# Patient Record
Sex: FEMALE | Race: WHITE
[De-identification: names, ages, dates, MRNs, and addresses within clinical notes are randomized per-mention and may not be internally consistent; named-entity substitution may affect disease eponyms.]

---

## 2017-02-05 ENCOUNTER — HOSPITAL ENCOUNTER (EMERGENCY)
Dept: HOSPITAL 62 - ER | Age: 22
Discharge: HOME | End: 2017-02-05
Payer: COMMERCIAL

## 2017-02-05 VITALS — SYSTOLIC BLOOD PRESSURE: 107 MMHG | DIASTOLIC BLOOD PRESSURE: 50 MMHG

## 2017-02-05 DIAGNOSIS — S00.03XA: Primary | ICD-10-CM

## 2017-02-05 DIAGNOSIS — Z88.6: ICD-10-CM

## 2017-02-05 DIAGNOSIS — R42: ICD-10-CM
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
## 2017-02-05 DIAGNOSIS — R11.0: ICD-10-CM

## 2017-02-05 PROCEDURE — S0119 ONDANSETRON 4 MG: HCPCS

## 2017-02-05 PROCEDURE — 70450 CT HEAD/BRAIN W/O DYE: CPT

## 2017-02-05 PROCEDURE — 99284 EMERGENCY DEPT VISIT MOD MDM: CPT

## 2017-02-05 PROCEDURE — 82962 GLUCOSE BLOOD TEST: CPT

## 2017-02-05 NOTE — ER DOCUMENT REPORT
ED Medical Screen (RME)





- General


Stated Complaint: HEAD INJURY


Mode of Arrival: Ambulatory


Information source: Patient


Notes: 


Patient states that she hit her head on a microwave oven while at work.  

Patient denies any loss of consciousness.  Patient does report some nausea but 

denies any vomiting.  Patient complains of some dizziness.  Patient states that 

she has not eaten at all today.





hx: PTSD, anxiety





I have greeted and performed a rapid initial assessment of this patient.  A 

comprehensive ED assessment and evaluation of the patient, analysis of test 

results and completion of the medical decision making process will be conducted 

by additional ED providers.


TRAVEL OUTSIDE OF THE [place] IN LAST 30 DAYS: No





- Related Data


Allergies/Adverse Reactions: 


 





oxycodone HCl [From Percocet] Adverse Reaction (Verified 02/05/17 13:38)


 











Past Medical History


Psychiatric Medical History: Reports: Hx Anxiety, Hx Depression


Past Surgical History: Reports: Hx Adenoidectomy, Hx Tonsillectomy





- Immunizations


Hx Diphtheria, Pertussis, Tetanus Vaccination: Yes - 2/14/14

## 2017-02-05 NOTE — ER DOCUMENT REPORT
ED Head/Face/Scalp Injury





- General


Chief Complaint: Head Injury


Stated Complaint: HEAD INJURY


Time seen by provider: 15:02


Mode of Arrival: Ambulatory


Information source: Patient


Notes: 


21-year-old female hit the top of her head on the microwave twice at work today 

at [employer] at 7:30 this morning.  She has felt nauseated and dizzy since.  

No loss of consciousness, no fall, no vomiting, Zofran has helped the nausea 

but she still feels slightly dizzy without vertigo when she stands.  Denies 

pregnancy.


TRAVEL OUTSIDE OF THE [place] IN LAST 30 DAYS: No





- Related Data


Allergies/Adverse Reactions: 


 





oxycodone HCl [From Percocet] Adverse Reaction (Verified 02/05/17 13:38)


 











Past Medical History





- General


Information source: Patient





- Social History


Smoking Status: Unknown if Ever Smoked


Chew tobacco use (# tins/day): No


Frequency of alcohol use: None


Drug Abuse: None


Lives with: Family


Family History: Reviewed & Not Pertinent


Patient has suicidal ideation: No


Patient has homicidal ideation: No


Renal/ Medical History: Denies: Hx Peritoneal Dialysis


Psychiatric Medical History: Reports: Hx Anxiety, Hx Depression


Past Surgical History: Reports: Hx Adenoidectomy, Hx Tonsillectomy





- Immunizations


Hx Diphtheria, Pertussis, Tetanus Vaccination: Yes - 2/14/14





Review of Systems





- Review of Systems


Constitutional: No symptoms reported


EENT: No symptoms reported


Cardiovascular: No symptoms reported


Respiratory: No symptoms reported


Gastrointestinal: No symptoms reported


Genitourinary: No symptoms reported


Female Genitourinary: No symptoms reported


Musculoskeletal: See HPI


Skin: No symptoms reported


Hematologic/Lymphatic: No symptoms reported


Neurological/Psychological: See HPI





Physical Exam





- Vital signs


Vitals: 


 











Temp Pulse Resp BP Pulse Ox


 


 97.5 F   77   14   115/48 L  97 


 


 02/05/17 13:38  02/05/17 13:38  02/05/17 13:38  02/05/17 13:38  02/05/17 13:38











Interpretation: Normal





- General


General appearance: Appears well, Alert


In distress: None





- HEENT


Head: Normocephalic, Atraumatic


Eyes: Normal


Conjunctiva: Normal


Extraocular movements intact: Yes


Pupils: PERRL


Nerve palsy: No


Tympanic membrane: Normal


Nasal: Normal


Mouth/Lips: Normal


Pharynx: Normal


Neck: Supple.  No: Lymphadenopathy





- Respiratory


Respiratory status: No respiratory distress


Chest status: Nontender


Breath sounds: Normal


Chest palpation: Normal





- Cardiovascular


Rhythm: Regular


Heart sounds: Normal auscultation


Murmur: No





- Abdominal


Inspection: Normal


Distension: No distension


Bowel sounds: Normal


Tenderness: Nontender.  No: Tender


Organomegaly: No organomegaly





- Back


Back: Normal, Nontender





- Extremities


General upper extremity: Normal inspection, Nontender, Normal color, Normal ROM

, Normal temperature


General lower extremity: Normal inspection, Nontender, Normal color, Normal ROM

, Normal temperature, Normal weight bearing.  No: Timothy's sign





- Neurological


Neuro grossly intact: Yes


Cognition: Normal


Orientation: AAOx4


[place] Coma Scale Eye Opening: Spontaneous


[place] Coma Scale Verbal: Oriented


[place] Coma Scale Motor: Obeys Commands


[place] Coma Scale Total: 15


Speech: Normal


Motor strength normal: LUE, RUE, LLE, RLE


Sensory: Normal





- Psychological


Associated symptoms: Normal affect, Normal mood





- Skin


Skin Temperature: Warm


Skin Moisture: Dry


Skin Color: Normal


Skin irregularity: negative: Rash





Course





- Re-evaluation


Re-evalutation: 


02/05/17 15:37


Vitals are stable.  I have consulted with the supervisory physician per 

[facility] Guidelines.  Dr. Kirby for ct scan order. Gait stable. No 

ataxia.  Patient is drinking ginger ale.





02/05/17 16:18


CT is negative.





- Vital Signs


Vital signs: 


 











Temp Pulse Resp BP Pulse Ox


 


 97.5 F   77   14   115/48 L  97 


 


 02/05/17 13:38  02/05/17 13:38  02/05/17 13:38  02/05/17 13:38  02/05/17 13:38














Discharge





- Discharge


Clinical Impression: 


 head injury, Nausea, Dizziness





Contusion


Qualifiers:


 Encounter type: initial encounter Contusion area: head Contusion of head detail

: scalp Qualified Code(s): S00.03XA - Contusion of scalp, initial encounter





Condition: Good


Disposition: HOME, SELF-CARE


Instructions:  Head Injury Precautions ([facility]), Nausea or Vomiting, Nonspecific (

[facility]), Dizziness ([facility]), Contusion ([facility]), Neurologist


Additional Instructions: 


eat before you go to work


plenty of fluids


tylenol for discomfort


to er any concerns


see neurologist if the dizziness persists.


Forms:  Return to Work


Referrals: 


TOM HAMM MD [ACTIVE STAFF] - Follow up as needed

## 2019-01-10 ENCOUNTER — HOSPITAL ENCOUNTER (OUTPATIENT)
Dept: HOSPITAL 62 - RAD | Age: 24
End: 2019-01-10
Attending: FAMILY MEDICINE
Payer: OTHER GOVERNMENT

## 2019-01-10 DIAGNOSIS — M79.89: ICD-10-CM

## 2019-01-10 DIAGNOSIS — M25.561: Primary | ICD-10-CM

## 2019-01-10 NOTE — RADIOLOGY REPORT (SQ)
EXAM DESCRIPTION:  MRI RT LOWER JOINT WITHOUT



COMPLETED DATE/TIME:  1/10/2019 8:02 am



REASON FOR STUDY:  RIGHT KNEE PAIN (M25.561) M25.561  PAIN IN RIGHT KNEE



COMPARISON:  None.



TECHNIQUE:  Rightknee images acquired and stored on PACS.  Multiplanar images include fat sensitive s
equences as T1, water sensitive sequences as FST2 or STIR, cartilage sensitive sequences as FSPD, and
 gradient echo sequences.



LIMITATIONS:  None.



FINDINGS:  JOINT AND BURSAE: No effusion.

BONE CORTEX AND MARROW: No alteration of signal to suggest marrow replacement. No worrisome bone lesi
ons. No occult fracture.

ACL: Intact. No degeneration or ganglion cyst.

PCL: Intact.

MCL: Intact. No periligamentous edema or fluid.

LCL: Intact. No periligamentous edema or fluid.

MEDIAL MENISCUS: No tears. No abnormal signal.

LATERAL MENISCUS: No tears. No abnormal signal.

MEDIAL COMPARTMENT: Cartilage preserved. No bone bruises or reactive marrow edema. No osteophytes.

LATERAL COMPARTMENT: Cartilage preserved. No bone bruises or reactive marrow edema. No osteophytes.

PATELLA: No chondromalacia. No subchondral cysts. Medial and lateral retinacula intact.

EXTENSOR MECHANISM: Intact. Quadriceps and patella tendons normal.

SOFT TISSUES: Adjacent muscles and subcutaneous tissues normal.  Normal flow void in popliteal artery
 and vein.

OTHER: No other significant finding.



IMPRESSION:  NORMAL MRI OF THE KNEE.



TECHNICAL DOCUMENTATION:  JOB ID:  [id number]

 2011 [email or web address]- All Rights Reserved



Reading location - IP/workstation name: [facility]

## 2019-12-27 ENCOUNTER — HOSPITAL ENCOUNTER (EMERGENCY)
Dept: HOSPITAL 62 - ER | Age: 24
Discharge: HOME | End: 2019-12-27
Payer: OTHER GOVERNMENT

## 2019-12-27 VITALS — DIASTOLIC BLOOD PRESSURE: 42 MMHG | SYSTOLIC BLOOD PRESSURE: 106 MMHG

## 2019-12-27 DIAGNOSIS — Z79.899: ICD-10-CM

## 2019-12-27 DIAGNOSIS — F32.9: ICD-10-CM

## 2019-12-27 DIAGNOSIS — R10.9: ICD-10-CM

## 2019-12-27 DIAGNOSIS — O26.891: ICD-10-CM

## 2019-12-27 DIAGNOSIS — O20.8: Primary | ICD-10-CM

## 2019-12-27 DIAGNOSIS — F41.9: ICD-10-CM

## 2019-12-27 DIAGNOSIS — O99.341: ICD-10-CM

## 2019-12-27 DIAGNOSIS — Z3A.09: ICD-10-CM

## 2019-12-27 DIAGNOSIS — R11.0: ICD-10-CM

## 2019-12-27 LAB
ADD MANUAL DIFF: NO
ALBUMIN SERPL-MCNC: 4.1 G/DL (ref 3.5–5)
ALP SERPL-CCNC: 42 U/L (ref 38–126)
ANION GAP SERPL CALC-SCNC: 9 MMOL/L (ref 5–19)
APPEARANCE UR: (no result)
APTT PPP: YELLOW S
AST SERPL-CCNC: 20 U/L (ref 14–36)
BASOPHILS # BLD AUTO: 0 10^3/UL (ref 0–0.2)
BASOPHILS NFR BLD AUTO: 0.3 % (ref 0–2)
BILIRUB DIRECT SERPL-MCNC: 0.1 MG/DL (ref 0–0.4)
BILIRUB SERPL-MCNC: 0.5 MG/DL (ref 0.2–1.3)
BILIRUB UR QL STRIP: NEGATIVE
BUN SERPL-MCNC: 10 MG/DL (ref 7–20)
CALCIUM: 9.5 MG/DL (ref 8.4–10.2)
CHLORIDE SERPL-SCNC: 105 MMOL/L (ref 98–107)
CO2 SERPL-SCNC: 24 MMOL/L (ref 22–30)
EOSINOPHIL # BLD AUTO: 0.2 10^3/UL (ref 0–0.6)
EOSINOPHIL NFR BLD AUTO: 3.1 % (ref 0–6)
ERYTHROCYTE [DISTWIDTH] IN BLOOD BY AUTOMATED COUNT: 12.8 % (ref 11.5–14)
GLUCOSE SERPL-MCNC: 82 MG/DL (ref 75–110)
GLUCOSE UR STRIP-MCNC: NEGATIVE MG/DL
HCT VFR BLD CALC: 34.7 % (ref 36–47)
HGB BLD-MCNC: 12.1 G/DL (ref 12–15.5)
KETONES UR STRIP-MCNC: NEGATIVE MG/DL
LYMPHOCYTES # BLD AUTO: 1.9 10^3/UL (ref 0.5–4.7)
LYMPHOCYTES NFR BLD AUTO: 26.2 % (ref 13–45)
MCH RBC QN AUTO: 30.2 PG (ref 27–33.4)
MCHC RBC AUTO-ENTMCNC: 35 G/DL (ref 32–36)
MCV RBC AUTO: 86 FL (ref 80–97)
MONOCYTES # BLD AUTO: 0.4 10^3/UL (ref 0.1–1.4)
MONOCYTES NFR BLD AUTO: 5.3 % (ref 3–13)
NEUTROPHILS # BLD AUTO: 4.6 10^3/UL (ref 1.7–8.2)
NEUTS SEG NFR BLD AUTO: 65.1 % (ref 42–78)
NITRITE UR QL STRIP: NEGATIVE
PH UR STRIP: 6 [PH] (ref 5–9)
PLATELET # BLD: 121 10^3/UL (ref 150–450)
POTASSIUM SERPL-SCNC: 3.9 MMOL/L (ref 3.6–5)
PROT SERPL-MCNC: 6.8 G/DL (ref 6.3–8.2)
PROT UR STRIP-MCNC: NEGATIVE MG/DL
RBC # BLD AUTO: 4.02 10^6/UL (ref 3.72–5.28)
SP GR UR STRIP: 1.02
TOTAL CELLS COUNTED % (AUTO): 100 %
UROBILINOGEN UR-MCNC: 4 MG/DL (ref ?–2)
WBC # BLD AUTO: 7.1 10^3/UL (ref 4–10.5)

## 2019-12-27 PROCEDURE — 36415 COLL VENOUS BLD VENIPUNCTURE: CPT

## 2019-12-27 PROCEDURE — 86901 BLOOD TYPING SEROLOGIC RH(D): CPT

## 2019-12-27 PROCEDURE — 84702 CHORIONIC GONADOTROPIN TEST: CPT

## 2019-12-27 PROCEDURE — 76801 OB US < 14 WKS SINGLE FETUS: CPT

## 2019-12-27 PROCEDURE — 99284 EMERGENCY DEPT VISIT MOD MDM: CPT

## 2019-12-27 PROCEDURE — 86900 BLOOD TYPING SEROLOGIC ABO: CPT

## 2019-12-27 PROCEDURE — 80053 COMPREHEN METABOLIC PANEL: CPT

## 2019-12-27 PROCEDURE — 85025 COMPLETE CBC W/AUTO DIFF WBC: CPT

## 2019-12-27 PROCEDURE — 96372 THER/PROPH/DIAG INJ SC/IM: CPT

## 2019-12-27 PROCEDURE — 86850 RBC ANTIBODY SCREEN: CPT

## 2019-12-27 PROCEDURE — 81001 URINALYSIS AUTO W/SCOPE: CPT

## 2019-12-27 NOTE — ER DOCUMENT REPORT
ED Medical Screen (RME)





- General


Chief Complaint: Vag Bleeding, +preg <12wks


Stated Complaint: VAGINAL BLEEDING


Time Seen by Provider: 19 19:03


Primary Care Provider: 


GARETT GARCIA MD [Primary Care Provider] - Follow up as needed


Mode of Arrival: Ambulatory


Information source: Patient


Notes: 





Otherwise healthy 24-year-old female presents emergency department with vaginal 

bleeding in the setting of pregnancy.  Patient is a .  She states she is 

approximately 8 weeks pregnant and started spotting just a few hours prior to 

arrival.  She also reports she has Rh- blood type.  She has not received RhoGam 

during this pregnancy.





Denies abdominal pain, nausea, vomiting, diarrhea.





I have greeted and performed a rapid initial assessment of this patient.  A 

comprehensive ED assessment and evaluation of the patient, analysis of test 

results and completion of the medical decision making process will be conducted 

by additional ED providers.  I have specifically instructed the patient or 

family members with the patient to immediately return to any nursing staff 

should anything change in the patient's condition or with their chief complaint.





TRAVEL OUTSIDE OF THE [place] IN LAST 30 DAYS: No





- Related Data


Allergies/Adverse Reactions: 


                                        





oxycodone HCl [From Percocet] Adverse Reaction (Verified 19 19:02)


   











Past Medical History


Renal/ Medical History: Denies: Hx Peritoneal Dialysis


Psychiatric Medical History: Reports: Hx Anxiety, Hx Depression


Past Surgical History: Reports: Hx Adenoidectomy, Hx Tonsillectomy





- Immunizations


Hx Diphtheria, Pertussis, Tetanus Vaccination: Yes - 14





Physical Exam





- Vital signs


Vitals: 





                                        











Temp Pulse Resp BP Pulse Ox


 


 98.6 F   82   16   109/40 L  100 


 


 19 19:01  19 19:01  19 19:01  19 19:01  19 19:01














Course





- Vital Signs


Vital signs: 





                                        











Temp Pulse Resp BP Pulse Ox


 


 98.6 F   82   16   109/40 L  100 


 


 19 19:01  19 19:01  19 19:01  19 19:01  19 19:01














Doctor's Discharge





- Discharge


Referrals: 


GARETT GRACIA MD [Primary Care Provider] - Follow up as needed

## 2019-12-27 NOTE — RADIOLOGY REPORT (SQ)
EXAM DESCRIPTION: 



US LESS THAN 14 WEEKS PREGNANCY



COMPLETED DATE/TME:  12/27/2019 19:11



CLINICAL HISTORY: 



24 years, Female, 8 weeks pregnant, vaginal bleed



COMPARISON:

None.



TECHNIQUE:





LIMITATIONS:

None.



FINDINGS:



There is a live 9 week 3 day IUP, based on a crown-rump length of

2.7 cm.



Embryonic cardiac activity was measured at 165 bpm.



There is a 3.8 x 3.6 x 0.7 cm subchorionic hemorrhage,

posteriorly.



The uterus measures 9.7 x 9.6 x 8.4 cm.



The right ovary is unremarkable.



The left ovary was not visualized.



The cervix measures 3.5 cm and is closed.



IMPRESSION:



Live IUP with a subchorionic hemorrhage

 



copyright 2011 [employer]- All Rights Reserved

## 2019-12-27 NOTE — ER DOCUMENT REPORT
ED GI/





- General


Chief Complaint: Vag Bleeding, +preg <12wks


Stated Complaint: VAGINAL BLEEDING


Time Seen by Provider: 12/27/19 19:03


Primary Care Provider: 


GARETT GARCIA MD [NO LOCAL MD] - Follow up as needed


Mode of Arrival: Ambulatory


Notes: 


Patient is a 24-year-old female that comes to the emergency department for chief

complaint of vaginal bleeding that started just prior to arrival.  Patient 

reports intermittent mild cramping but denies any current symptoms.  She denies 

dizziness, fever.  She states she is having a lot of morning sickness but she 

has no complaints otherwise.  She is G3, P2 at approximately 8 weeks gestation. 

She did have an ultrasound about a week ago confirming an intrauterine pregnancy

and states she was told at that time she had a subchorionic bleed which was 

small, however she states she did not receive RhoGam at that time.  She states 

she did take Macrobid from that visit and completed it.  She is not on any other

medications at this time.





TRAVEL OUTSIDE OF THE [place] IN LAST 30 DAYS: No





- Related Data


Allergies/Adverse Reactions: 


                                        





oxycodone HCl [From Percocet] Adverse Reaction (Verified 12/27/19 19:02)


   








Home Medications: Zoloft and MVM





Past Medical History





- General


Information source: Patient





- Social History


Smoking Status: Never Smoker


Chew tobacco use (# tins/day): No


Frequency of alcohol use: None


Drug Abuse: None


Lives with: Family


Family History: Reviewed & Not Pertinent


Patient has suicidal ideation: No


Patient has homicidal ideation: No


Renal/ Medical History: Denies: Hx Peritoneal Dialysis


Psychiatric Medical History: Reports: Hx Anxiety, Hx Depression


Past Surgical History: Reports: Hx Adenoidectomy, Hx Tonsillectomy





- Immunizations


Hx Diphtheria, Pertussis, Tetanus Vaccination: Yes - 2/14/14





Review of Systems





- Review of Systems


Constitutional: No symptoms reported


EENT: No symptoms reported


Cardiovascular: No symptoms reported


Respiratory: No symptoms reported


Gastrointestinal: See HPI


Genitourinary: See HPI


Female Genitourinary: See HPI


Musculoskeletal: No symptoms reported


Skin: No symptoms reported


Hematologic/Lymphatic: No symptoms reported


Neurological/Psychological: No symptoms reported





Physical Exam





- Vital signs


Vitals: 


                                        











Temp Pulse Resp BP Pulse Ox


 


 98.6 F   82   16   109/40 L  100 


 


 12/27/19 19:01  12/27/19 19:01  12/27/19 19:01  12/27/19 19:01  12/27/19 19:01














- Notes


Notes: 





GENERAL: Alert, interacts well. No acute distress.


HEAD: Normocephalic, atraumatic.


EYES: Pupils equal, round, and reactive to light. Extraocular movements intact.


ENT: Oral mucosa moist, tongue midline. Oropharynx unremarkable. Airway patent. 


LUNGS: Clear to auscultation bilaterally, no wheezes, rales, or rhonchi. No 

respiratory distress.


HEART: Regular rate and rhythm. No murmur


ABDOMEN: Soft, non-tender. Non-distended. 


EXTREMITIES: Moves all 4 extremities spontaneously. No edema, normal radial and 

dorsalis pedis pulses bilaterally. No cyanosis.


BACK: no cervical, thoracic, lumbar midline tenderness. No saddle anesthesia, 

normal distal neurovascular exam. Moves all extremities in full range of motion.


NEUROLOGICAL: Alert and oriented x3. Normal speech. Cranial nerves II through 

XII grossly intact. 


PSYCH: Normal affect, normal mood.


SKIN: Warm, dry, normal turgor. No rashes or lesions noted.





Course





- Re-evaluation


Re-evalutation: 


CBC unremarkable including hemoglobin, chemistry unremarkable, urinalysis 

unremarkable.  RhoGam is indicated and has not been given previously, she was 

given this tonight.





Ultrasound shows subchorionic hemorrhage with living intrauterine pregnancy.  

Physical exam is unremarkable, nontender abdomen, patient does not have any 

current complaints or any current bleeding reported.





Discussed subchronic bleed, RhoGam, work-up in detail.  Patient is requesting 

nausea medication for home, she was provided with this.  Discussed details, 

follow-up, return precautions at length.  Patient states appreciation and 

agreement.  Stable at time of discharge.





- Vital Signs


Vital signs: 


                                        











Temp Pulse Resp BP Pulse Ox


 


 98.3 F   75   20   106/42 L  99 


 


 12/27/19 23:00  12/27/19 23:00  12/27/19 23:00  12/27/19 23:00  12/27/19 23:00














- Laboratory


Result Diagrams: 


                                 12/27/19 19:20





                                 12/27/19 19:20


Laboratory results interpreted by me: 


                                        











  12/27/19 12/27/19 12/27/19





  19:20 19:20 19:20


 


Hct  34.7 L  


 


Plt Count  121 L  


 


Creatinine   0.49 L 


 


Beta HCG, Quant   


 


Urine Urobilinogen    4.0 H


 


Ur Leukocyte Esterase    TRACE H


 


Urine Ascorbic Acid    40 H














  12/27/19





  19:20


 


Hct 


 


Plt Count 


 


Creatinine 


 


Beta HCG, Quant  [id number] H


 


Urine Urobilinogen 


 


Ur Leukocyte Esterase 


 


Urine Ascorbic Acid 














Discharge





- Discharge


Clinical Impression: 


 Abdominal cramping affecting pregnancy, Vaginal bleeding affecting early 

pregnancy





Condition: Stable


Disposition: HOME, SELF-CARE


Additional Instructions: 


Your ultrasound shows a living pregnancy in the uterus at 9 weeks and 3 days.  

There is also a subchorionic hemorrhage as we discussed.  Please perform pelvic 

rest (avoid heavy lifting, running, intense physical activity, sexual 

intercourse, etc. until cleared by OB/GYN).  Take Tylenol for pain.  Use nausea 

medication as prescribed if needed.





You did receive RhoGam tonight.  Remember to take prenatal vitamins.





Follow-up with OB/GYN for additional evaluation and management.  Return if you 

worsen including uncontrolled vomiting, severe worsening pain, heavy bleeding 

with dizziness or passing out, or any other concerning or worsening symptoms.


Prescriptions: 


Metoclopramide HCl [Reglan] 5 mg PO ASDIR PRN #30 tablet


 PRN Reason: 


Referrals: 


GARETT GARCIA MD [NO LOCAL MD] - Follow up as needed

## 2020-05-11 ENCOUNTER — HOSPITAL ENCOUNTER (OUTPATIENT)
Dept: HOSPITAL 62 - OD | Age: 25
End: 2020-05-11
Attending: ADVANCED PRACTICE MIDWIFE
Payer: MEDICAID

## 2020-05-11 DIAGNOSIS — Z34.83: Primary | ICD-10-CM

## 2020-05-11 PROCEDURE — 86901 BLOOD TYPING SEROLOGIC RH(D): CPT

## 2020-05-11 PROCEDURE — 86900 BLOOD TYPING SEROLOGIC ABO: CPT

## 2020-05-11 PROCEDURE — 36415 COLL VENOUS BLD VENIPUNCTURE: CPT

## 2020-07-15 ENCOUNTER — HOSPITAL ENCOUNTER (OUTPATIENT)
Dept: HOSPITAL 62 - LC | Age: 25
Discharge: HOME | End: 2020-07-15
Attending: OBSTETRICS & GYNECOLOGY
Payer: MEDICAID

## 2020-07-15 DIAGNOSIS — E86.0: ICD-10-CM

## 2020-07-15 DIAGNOSIS — Z88.6: ICD-10-CM

## 2020-07-15 DIAGNOSIS — O47.1: Primary | ICD-10-CM

## 2020-07-15 DIAGNOSIS — O99.283: ICD-10-CM

## 2020-07-15 DIAGNOSIS — Z3A.37: ICD-10-CM

## 2020-07-15 LAB
APPEARANCE UR: (no result)
APTT PPP: YELLOW S
BARBITURATES UR QL SCN: NEGATIVE
BILIRUB UR QL STRIP: NEGATIVE
GLUCOSE UR STRIP-MCNC: NEGATIVE MG/DL
KETONES UR STRIP-MCNC: NEGATIVE MG/DL
METHADONE UR QL SCN: NEGATIVE
NITRITE UR QL STRIP: NEGATIVE
PCP UR QL SCN: NEGATIVE
PH UR STRIP: 6 [PH] (ref 5–9)
PROT UR STRIP-MCNC: NEGATIVE MG/DL
SP GR UR STRIP: 1.01
URINE AMPHETAMINES SCREEN: NEGATIVE
URINE BENZODIAZEPINES SCREEN: NEGATIVE
URINE COCAINE SCREEN: NEGATIVE
URINE MARIJUANA (THC) SCREEN: NEGATIVE
UROBILINOGEN UR-MCNC: NEGATIVE MG/DL (ref ?–2)

## 2020-07-15 PROCEDURE — 80307 DRUG TEST PRSMV CHEM ANLYZR: CPT

## 2020-07-15 PROCEDURE — 81001 URINALYSIS AUTO W/SCOPE: CPT
